# Patient Record
Sex: MALE | Race: OTHER | NOT HISPANIC OR LATINO
[De-identification: names, ages, dates, MRNs, and addresses within clinical notes are randomized per-mention and may not be internally consistent; named-entity substitution may affect disease eponyms.]

---

## 2017-01-06 ENCOUNTER — APPOINTMENT (OUTPATIENT)
Dept: NEUROLOGY | Facility: CLINIC | Age: 42
End: 2017-01-06

## 2017-01-06 VITALS
WEIGHT: 125 LBS | HEIGHT: 67 IN | SYSTOLIC BLOOD PRESSURE: 102 MMHG | OXYGEN SATURATION: 99 % | HEART RATE: 93 BPM | DIASTOLIC BLOOD PRESSURE: 67 MMHG | TEMPERATURE: 97.4 F | BODY MASS INDEX: 19.62 KG/M2

## 2017-01-06 DIAGNOSIS — R56.9 UNSPECIFIED CONVULSIONS: ICD-10-CM

## 2017-01-26 ENCOUNTER — APPOINTMENT (OUTPATIENT)
Dept: PULMONOLOGY | Facility: CLINIC | Age: 42
End: 2017-01-26

## 2017-04-03 ENCOUNTER — APPOINTMENT (OUTPATIENT)
Dept: NEUROLOGY | Facility: CLINIC | Age: 42
End: 2017-04-03

## 2017-04-03 VITALS
DIASTOLIC BLOOD PRESSURE: 81 MMHG | BODY MASS INDEX: 19.78 KG/M2 | HEIGHT: 67 IN | WEIGHT: 126 LBS | SYSTOLIC BLOOD PRESSURE: 116 MMHG | OXYGEN SATURATION: 98 % | HEART RATE: 84 BPM

## 2017-09-01 ENCOUNTER — RX RENEWAL (OUTPATIENT)
Age: 42
End: 2017-09-01

## 2017-09-11 ENCOUNTER — OTHER (OUTPATIENT)
Age: 42
End: 2017-09-11

## 2017-10-17 ENCOUNTER — LABORATORY RESULT (OUTPATIENT)
Age: 42
End: 2017-10-17

## 2017-10-17 ENCOUNTER — APPOINTMENT (OUTPATIENT)
Dept: PULMONOLOGY | Facility: CLINIC | Age: 42
End: 2017-10-17
Payer: COMMERCIAL

## 2017-10-17 VITALS
SYSTOLIC BLOOD PRESSURE: 122 MMHG | OXYGEN SATURATION: 97 % | TEMPERATURE: 97.6 F | HEIGHT: 67 IN | HEART RATE: 83 BPM | DIASTOLIC BLOOD PRESSURE: 80 MMHG | WEIGHT: 126 LBS | BODY MASS INDEX: 19.78 KG/M2

## 2017-10-17 DIAGNOSIS — Z87.09 PERSONAL HISTORY OF OTHER DISEASES OF THE RESPIRATORY SYSTEM: ICD-10-CM

## 2017-10-17 DIAGNOSIS — G90.519 COMPLEX REGIONAL PAIN SYNDROME I OF UNSPECIFIED UPPER LIMB: ICD-10-CM

## 2017-10-17 PROCEDURE — 99214 OFFICE O/P EST MOD 30 MIN: CPT | Mod: 25

## 2017-10-17 PROCEDURE — 94010 BREATHING CAPACITY TEST: CPT

## 2017-10-17 PROCEDURE — 71020: CPT

## 2017-10-23 LAB
25(OH)D3 SERPL-MCNC: 25.9 NG/ML
ALBUMIN SERPL ELPH-MCNC: 5 G/DL
ALP BLD-CCNC: 137 U/L
ALT SERPL-CCNC: 50 U/L
ANION GAP SERPL CALC-SCNC: 26 MMOL/L
AST SERPL-CCNC: 25 U/L
BASOPHILS # BLD AUTO: 0.05 K/UL
BASOPHILS NFR BLD AUTO: 0.7 %
BILIRUB SERPL-MCNC: 0.2 MG/DL
BUN SERPL-MCNC: 8 MG/DL
CALCIUM SERPL-MCNC: 10 MG/DL
CHLORIDE SERPL-SCNC: 93 MMOL/L
CO2 SERPL-SCNC: 18 MMOL/L
CREAT SERPL-MCNC: 0.84 MG/DL
EOSINOPHIL # BLD AUTO: 0.24 K/UL
EOSINOPHIL NFR BLD AUTO: 3.1 %
GLUCOSE SERPL-MCNC: 48 MG/DL
HCT VFR BLD CALC: 46.4 %
HGB BLD-MCNC: 16.2 G/DL
IMM GRANULOCYTES NFR BLD AUTO: 0.3 %
LITHIUM SERPL-SCNC: <.2 MMOL/L
LYMPHOCYTES # BLD AUTO: 3.17 K/UL
LYMPHOCYTES NFR BLD AUTO: 41.5 %
MAN DIFF?: NORMAL
MCHC RBC-ENTMCNC: 32.9 PG
MCHC RBC-ENTMCNC: 34.9 GM/DL
MCV RBC AUTO: 94.3 FL
MONOCYTES # BLD AUTO: 0.58 K/UL
MONOCYTES NFR BLD AUTO: 7.6 %
NEUTROPHILS # BLD AUTO: 3.58 K/UL
NEUTROPHILS NFR BLD AUTO: 46.8 %
PLATELET # BLD AUTO: 333 K/UL
POTASSIUM SERPL-SCNC: 4.5 MMOL/L
PROT SERPL-MCNC: 7.8 G/DL
RBC # BLD: 4.92 M/UL
RBC # FLD: 12.8 %
SODIUM SERPL-SCNC: 137 MMOL/L
T3RU NFR SERPL: 1.12 INDEX
T4 FREE SERPL-MCNC: 1.2 NG/DL
T4 SERPL-MCNC: 6.7 UG/DL
TSH SERPL-ACNC: 2.01 UIU/ML
VALPROATE SERPL-MCNC: <2 UG/ML
WBC # FLD AUTO: 7.64 K/UL

## 2017-11-29 ENCOUNTER — APPOINTMENT (OUTPATIENT)
Dept: NEUROLOGY | Facility: CLINIC | Age: 42
End: 2017-11-29

## 2017-11-30 ENCOUNTER — RX RENEWAL (OUTPATIENT)
Age: 42
End: 2017-11-30

## 2018-01-03 ENCOUNTER — RX RENEWAL (OUTPATIENT)
Age: 43
End: 2018-01-03

## 2018-01-03 DIAGNOSIS — R51 HEADACHE: ICD-10-CM

## 2018-02-20 ENCOUNTER — APPOINTMENT (OUTPATIENT)
Dept: PULMONOLOGY | Facility: CLINIC | Age: 43
End: 2018-02-20

## 2018-05-02 ENCOUNTER — APPOINTMENT (OUTPATIENT)
Dept: OTOLARYNGOLOGY | Facility: CLINIC | Age: 43
End: 2018-05-02

## 2018-07-26 PROBLEM — R56.9 SEIZURE: Status: ACTIVE | Noted: 2017-01-06

## 2018-08-16 ENCOUNTER — APPOINTMENT (OUTPATIENT)
Dept: PULMONOLOGY | Facility: CLINIC | Age: 43
End: 2018-08-16

## 2018-09-07 ENCOUNTER — APPOINTMENT (OUTPATIENT)
Dept: PULMONOLOGY | Facility: CLINIC | Age: 43
End: 2018-09-07

## 2018-09-18 ENCOUNTER — APPOINTMENT (OUTPATIENT)
Dept: NEUROLOGY | Facility: CLINIC | Age: 43
End: 2018-09-18

## 2018-10-30 ENCOUNTER — APPOINTMENT (OUTPATIENT)
Dept: NEUROLOGY | Facility: CLINIC | Age: 43
End: 2018-10-30
Payer: COMMERCIAL

## 2018-10-30 VITALS
OXYGEN SATURATION: 94 % | WEIGHT: 126 LBS | HEART RATE: 90 BPM | DIASTOLIC BLOOD PRESSURE: 74 MMHG | HEIGHT: 67 IN | BODY MASS INDEX: 19.78 KG/M2 | SYSTOLIC BLOOD PRESSURE: 109 MMHG

## 2018-10-30 PROCEDURE — 99214 OFFICE O/P EST MOD 30 MIN: CPT

## 2018-11-01 ENCOUNTER — APPOINTMENT (OUTPATIENT)
Dept: OTOLARYNGOLOGY | Facility: CLINIC | Age: 43
End: 2018-11-01

## 2019-05-07 ENCOUNTER — RX RENEWAL (OUTPATIENT)
Age: 44
End: 2019-05-07

## 2019-06-11 ENCOUNTER — APPOINTMENT (OUTPATIENT)
Dept: NEUROLOGY | Facility: CLINIC | Age: 44
End: 2019-06-11

## 2019-07-15 ENCOUNTER — APPOINTMENT (OUTPATIENT)
Dept: PULMONOLOGY | Facility: CLINIC | Age: 44
End: 2019-07-15

## 2019-07-29 ENCOUNTER — APPOINTMENT (OUTPATIENT)
Dept: PULMONOLOGY | Facility: CLINIC | Age: 44
End: 2019-07-29

## 2019-09-10 ENCOUNTER — RX RENEWAL (OUTPATIENT)
Age: 44
End: 2019-09-10

## 2019-09-12 ENCOUNTER — RX RENEWAL (OUTPATIENT)
Age: 44
End: 2019-09-12

## 2019-10-01 ENCOUNTER — RX RENEWAL (OUTPATIENT)
Age: 44
End: 2019-10-01

## 2019-10-25 ENCOUNTER — APPOINTMENT (OUTPATIENT)
Dept: OTOLARYNGOLOGY | Facility: CLINIC | Age: 44
End: 2019-10-25

## 2019-11-04 ENCOUNTER — APPOINTMENT (OUTPATIENT)
Dept: PULMONOLOGY | Facility: CLINIC | Age: 44
End: 2019-11-04
Payer: COMMERCIAL

## 2019-11-04 VITALS
WEIGHT: 126 LBS | OXYGEN SATURATION: 95 % | BODY MASS INDEX: 19.78 KG/M2 | HEART RATE: 102 BPM | HEIGHT: 67 IN | TEMPERATURE: 97.5 F

## 2019-11-04 DIAGNOSIS — R04.2 HEMOPTYSIS: ICD-10-CM

## 2019-11-04 PROCEDURE — 71046 X-RAY EXAM CHEST 2 VIEWS: CPT

## 2019-11-04 PROCEDURE — 99214 OFFICE O/P EST MOD 30 MIN: CPT

## 2019-11-05 NOTE — HISTORY OF PRESENT ILLNESS
[FreeTextEntry1] : coughing up blood, for been a month.feels fatigued.  yellow .  moderate amoutn of blood.  he has a seizure disorder and causalgia from a chest tube insertion that occurred after ptx surgery.  disabled because of this and seizure disorder as well.  no fever, no increased dyspnea, no evidence for exacerbations

## 2019-11-05 NOTE — PROCEDURE
[FreeTextEntry1] : spirometry not done; his pain syndrome is worsened when he breaths out hard\par \par chest film no change from before

## 2019-11-05 NOTE — DISCUSSION/SUMMARY
[FreeTextEntry1] : likely benign bronchial bleeding,\par \par but in light of extensive smoking hx will get a ct of the chest\par \par also will get sputum culture

## 2019-11-05 NOTE — REASON FOR VISIT
[Follow-Up] : a follow-up visit [FreeTextEntry1] : ex smoker with unclear pulmonary disease ( restrictiive) who has coughed up sputum mixed with blood

## 2019-11-05 NOTE — PHYSICAL EXAM
[FreeTextEntry1] : thin [Normal Conjunctiva] : the conjunctiva exhibited no abnormalities [Eyelids - No Xanthelasma] : the eyelids demonstrated no xanthelasmas [Neck Appearance] : the appearance of the neck was normal [Neck Cervical Mass (___cm)] : no neck mass was observed [Jugular Venous Distention Increased] : there was no jugular-venous distention [Thyroid Diffuse Enlargement] : the thyroid was not enlarged [Thyroid Nodule] : there were no palpable thyroid nodules [Heart Rate And Rhythm] : heart rate and rhythm were normal [Heart Sounds] : normal S1 and S2 [Murmurs] : no murmurs present [Respiration, Rhythm And Depth] : normal respiratory rhythm and effort [Exaggerated Use Of Accessory Muscles For Inspiration] : no accessory muscle use [Auscultation Breath Sounds / Voice Sounds] : lungs were clear to auscultation bilaterally [Abdomen Soft] : soft [Abdomen Tenderness] : non-tender [Abdomen Mass (___ Cm)] : no abdominal mass palpated [Abnormal Walk] : normal gait [Gait - Sufficient For Exercise Testing] : the gait was sufficient for exercise testing [Nail Clubbing] : no clubbing of the fingernails [Cyanosis, Localized] : no localized cyanosis [Petechial Hemorrhages (___cm)] : no petechial hemorrhages [] : no ischemic changes [Deep Tendon Reflexes (DTR)] : deep tendon reflexes were 2+ and symmetric [Sensation] : the sensory exam was normal to light touch and pinprick [No Focal Deficits] : no focal deficits [Oriented To Time, Place, And Person] : oriented to person, place, and time [Impaired Insight] : insight and judgment were intact [Affect] : the affect was normal

## 2019-11-05 NOTE — REVIEW OF SYSTEMS
[Fatigue] : fatigue [Cough] : cough [Sputum] : sputum  [Dyspnea] : no dyspnea [Nocturia] : nocturia [As Noted in HPI] : as noted in HPI [Seizures] : seizures [Negative] : Hematologic

## 2019-11-07 ENCOUNTER — APPOINTMENT (OUTPATIENT)
Dept: NEUROLOGY | Facility: CLINIC | Age: 44
End: 2019-11-07

## 2019-11-14 ENCOUNTER — APPOINTMENT (OUTPATIENT)
Dept: NEUROLOGY | Facility: CLINIC | Age: 44
End: 2019-11-14
Payer: COMMERCIAL

## 2019-11-14 VITALS
DIASTOLIC BLOOD PRESSURE: 90 MMHG | WEIGHT: 126 LBS | HEIGHT: 67 IN | SYSTOLIC BLOOD PRESSURE: 127 MMHG | OXYGEN SATURATION: 95 % | BODY MASS INDEX: 19.78 KG/M2 | HEART RATE: 102 BPM

## 2019-11-14 PROCEDURE — 99214 OFFICE O/P EST MOD 30 MIN: CPT

## 2019-11-22 ENCOUNTER — APPOINTMENT (OUTPATIENT)
Dept: OTOLARYNGOLOGY | Facility: CLINIC | Age: 44
End: 2019-11-22
Payer: COMMERCIAL

## 2019-11-22 VITALS
HEART RATE: 98 BPM | OXYGEN SATURATION: 98 % | SYSTOLIC BLOOD PRESSURE: 106 MMHG | DIASTOLIC BLOOD PRESSURE: 73 MMHG | HEIGHT: 67 IN | WEIGHT: 165 LBS | BODY MASS INDEX: 25.9 KG/M2

## 2019-11-22 DIAGNOSIS — J34.89 OTHER SPECIFIED DISORDERS OF NOSE AND NASAL SINUSES: ICD-10-CM

## 2019-11-22 DIAGNOSIS — H04.201 UNSPECIFIED EPIPHORA, RIGHT SIDE: ICD-10-CM

## 2019-11-22 PROCEDURE — 99204 OFFICE O/P NEW MOD 45 MIN: CPT | Mod: 25

## 2019-11-22 PROCEDURE — 69210 REMOVE IMPACTED EAR WAX UNI: CPT

## 2019-11-22 PROCEDURE — 31231 NASAL ENDOSCOPY DX: CPT

## 2019-11-22 NOTE — HISTORY OF PRESENT ILLNESS
[de-identified] : 44M w/ PMH of silent sinus syndrome s/p right MMA, partial ethmoidectomy and septoplasty 8/19/14, now with right eye epiphora. Patient reports intermittent nasal congestion, right ear cerumen impaction and longstanding right ear hearing loss. Symptoms have been persistent over the past year. Symptoms are constant during the day.  He has no mitigating or exacerbating factors. \par \par Pt feels some ear fullness as well.  [Hearing Loss] : no hearing loss [Ear Fullness] : ear fullness [Tinnitus] : no tinnitus [Vertigo] : no vertigo [None] : No associated symptoms are reported.

## 2019-11-22 NOTE — REVIEW OF SYSTEMS
[Patient Intake Form Reviewed] : Patient intake form was reviewed [Hearing Loss] : hearing loss [Nasal Congestion] : nasal congestion [Recurrent Sinus Infections] : recurrent sinus infections [As Noted in HPI] : as noted in HPI [Negative] : Constitutional [Ear Pain] : no ear pain [Sinus Pain] : no sinus pain [Sinus Pressure] : no sinus pressure [Discolored Nasal Discharge] : no discolored nasal discharge [de-identified] : recurrent sinus infections since subsided [FreeTextEntry1] : all other ROS negative

## 2019-11-22 NOTE — PROCEDURE
[Topical Lidocaine] : topical lidocaine [Oxymetazoline HCl] : oxymetazoline HCl [Flexible Endoscope] : examined with the flexible endoscope [Nasal Mucosa Crusts / Sores] : no lesions [Nasal Mucosa] : no polyps [Normal] : the nasopharynx was normal [FreeTextEntry6] : The following anatomic sites were directly examined in a sequential fashion:\par The scope was introduced in the nasal passage between the middle and inferior turbinates to exam the inferior portion of the middle meatus and the fontanelle, as well as the maxillary ostia. Next, the scope was passed medically and posteriorly to the middle turbinates to examine the sphenoethmoid recess and the superior turbinate region.\par  [de-identified] : epiphora

## 2019-11-22 NOTE — PHYSICAL EXAM
[Nasal Endoscopy Performed] : nasal endoscopy was performed, see procedure section for findings [Normal] : no rashes [de-identified] : right ear cerumen, otherwise TMs intact bilaterally [de-identified] : no evidence of lacrimal duct drainage scarring, no masses or lesions

## 2019-11-25 ENCOUNTER — APPOINTMENT (OUTPATIENT)
Dept: PULMONOLOGY | Facility: CLINIC | Age: 44
End: 2019-11-25

## 2019-12-02 ENCOUNTER — FORM ENCOUNTER (OUTPATIENT)
Age: 44
End: 2019-12-02

## 2019-12-03 ENCOUNTER — APPOINTMENT (OUTPATIENT)
Dept: CT IMAGING | Facility: HOSPITAL | Age: 44
End: 2019-12-03
Payer: COMMERCIAL

## 2019-12-03 ENCOUNTER — OUTPATIENT (OUTPATIENT)
Dept: OUTPATIENT SERVICES | Facility: HOSPITAL | Age: 44
LOS: 1 days | End: 2019-12-03
Payer: COMMERCIAL

## 2019-12-03 PROCEDURE — 70486 CT MAXILLOFACIAL W/O DYE: CPT

## 2019-12-03 PROCEDURE — 70486 CT MAXILLOFACIAL W/O DYE: CPT | Mod: 26

## 2019-12-08 ENCOUNTER — TRANSCRIPTION ENCOUNTER (OUTPATIENT)
Age: 44
End: 2019-12-08

## 2019-12-11 ENCOUNTER — NON-APPOINTMENT (OUTPATIENT)
Age: 44
End: 2019-12-11

## 2019-12-11 ENCOUNTER — APPOINTMENT (OUTPATIENT)
Dept: OPHTHALMOLOGY | Facility: CLINIC | Age: 44
End: 2019-12-11
Payer: COMMERCIAL

## 2019-12-11 PROCEDURE — 92134 CPTRZ OPH DX IMG PST SGM RTA: CPT

## 2019-12-11 PROCEDURE — 92004 COMPRE OPH EXAM NEW PT 1/>: CPT

## 2019-12-20 ENCOUNTER — APPOINTMENT (OUTPATIENT)
Dept: OPHTHALMOLOGY | Facility: CLINIC | Age: 44
End: 2019-12-20

## 2020-02-18 ENCOUNTER — RX RENEWAL (OUTPATIENT)
Age: 45
End: 2020-02-18

## 2020-03-11 ENCOUNTER — RX RENEWAL (OUTPATIENT)
Age: 45
End: 2020-03-11

## 2020-05-06 ENCOUNTER — APPOINTMENT (OUTPATIENT)
Dept: NEUROLOGY | Facility: CLINIC | Age: 45
End: 2020-05-06
Payer: COMMERCIAL

## 2020-05-06 PROCEDURE — 99213 OFFICE O/P EST LOW 20 MIN: CPT | Mod: 95

## 2020-05-06 RX ORDER — OMEGA-3/DHA/EPA/FISH OIL 300-1000MG
400 CAPSULE ORAL
Qty: 30 | Refills: 4 | Status: DISCONTINUED | COMMUNITY
Start: 2017-04-03 | End: 2020-05-06

## 2020-08-07 RX ORDER — LEVALBUTEROL TARTRATE 45 UG/1
45 AEROSOL, METERED ORAL
Qty: 1 | Refills: 11 | Status: ACTIVE | COMMUNITY
Start: 2017-10-17 | End: 1900-01-01

## 2020-08-19 ENCOUNTER — RX RENEWAL (OUTPATIENT)
Age: 45
End: 2020-08-19

## 2020-10-08 ENCOUNTER — APPOINTMENT (OUTPATIENT)
Dept: NEUROLOGY | Facility: CLINIC | Age: 45
End: 2020-10-08

## 2020-10-13 ENCOUNTER — APPOINTMENT (OUTPATIENT)
Dept: NEUROLOGY | Facility: CLINIC | Age: 45
End: 2020-10-13

## 2020-10-14 ENCOUNTER — APPOINTMENT (OUTPATIENT)
Dept: NEUROLOGY | Facility: CLINIC | Age: 45
End: 2020-10-14

## 2020-10-20 ENCOUNTER — APPOINTMENT (OUTPATIENT)
Dept: OPHTHALMOLOGY | Facility: CLINIC | Age: 45
End: 2020-10-20

## 2020-11-17 ENCOUNTER — APPOINTMENT (OUTPATIENT)
Dept: OPHTHALMOLOGY | Facility: CLINIC | Age: 45
End: 2020-11-17

## 2020-11-19 ENCOUNTER — APPOINTMENT (OUTPATIENT)
Dept: NEUROLOGY | Facility: CLINIC | Age: 45
End: 2020-11-19
Payer: COMMERCIAL

## 2020-11-19 VITALS
OXYGEN SATURATION: 94 % | TEMPERATURE: 97.1 F | SYSTOLIC BLOOD PRESSURE: 114 MMHG | DIASTOLIC BLOOD PRESSURE: 82 MMHG | HEART RATE: 85 BPM | HEIGHT: 67 IN | BODY MASS INDEX: 25.9 KG/M2 | WEIGHT: 165 LBS

## 2020-11-19 PROCEDURE — 99214 OFFICE O/P EST MOD 30 MIN: CPT

## 2020-12-11 ENCOUNTER — APPOINTMENT (OUTPATIENT)
Dept: OPHTHALMOLOGY | Facility: CLINIC | Age: 45
End: 2020-12-11

## 2021-01-27 ENCOUNTER — TRANSCRIPTION ENCOUNTER (OUTPATIENT)
Age: 46
End: 2021-01-27

## 2021-02-01 ENCOUNTER — RX RENEWAL (OUTPATIENT)
Age: 46
End: 2021-02-01

## 2021-02-17 ENCOUNTER — APPOINTMENT (OUTPATIENT)
Dept: NEUROLOGY | Facility: CLINIC | Age: 46
End: 2021-02-17

## 2021-06-14 ENCOUNTER — RX RENEWAL (OUTPATIENT)
Age: 46
End: 2021-06-14

## 2021-07-30 ENCOUNTER — APPOINTMENT (OUTPATIENT)
Dept: PULMONOLOGY | Facility: CLINIC | Age: 46
End: 2021-07-30

## 2021-08-13 DIAGNOSIS — Z00.00 ENCOUNTER FOR GENERAL ADULT MEDICAL EXAMINATION W/OUT ABNORMAL FINDINGS: ICD-10-CM

## 2021-08-31 ENCOUNTER — APPOINTMENT (OUTPATIENT)
Dept: NEUROLOGY | Facility: CLINIC | Age: 46
End: 2021-08-31

## 2021-09-01 ENCOUNTER — APPOINTMENT (OUTPATIENT)
Dept: NEUROLOGY | Facility: CLINIC | Age: 46
End: 2021-09-01

## 2021-09-20 ENCOUNTER — APPOINTMENT (OUTPATIENT)
Dept: PULMONOLOGY | Facility: CLINIC | Age: 46
End: 2021-09-20

## 2021-09-22 ENCOUNTER — APPOINTMENT (OUTPATIENT)
Dept: NEUROLOGY | Facility: CLINIC | Age: 46
End: 2021-09-22

## 2021-09-24 ENCOUNTER — TRANSCRIPTION ENCOUNTER (OUTPATIENT)
Age: 46
End: 2021-09-24

## 2021-09-27 ENCOUNTER — TRANSCRIPTION ENCOUNTER (OUTPATIENT)
Age: 46
End: 2021-09-27

## 2021-09-29 ENCOUNTER — APPOINTMENT (OUTPATIENT)
Dept: NEUROLOGY | Facility: CLINIC | Age: 46
End: 2021-09-29
Payer: COMMERCIAL

## 2021-09-29 ENCOUNTER — NON-APPOINTMENT (OUTPATIENT)
Age: 46
End: 2021-09-29

## 2021-09-29 VITALS
DIASTOLIC BLOOD PRESSURE: 90 MMHG | SYSTOLIC BLOOD PRESSURE: 129 MMHG | OXYGEN SATURATION: 96 % | WEIGHT: 170 LBS | TEMPERATURE: 97.6 F | BODY MASS INDEX: 26.68 KG/M2 | HEART RATE: 94 BPM | HEIGHT: 67 IN

## 2021-09-29 DIAGNOSIS — S09.90XA UNSPECIFIED INJURY OF HEAD, INITIAL ENCOUNTER: ICD-10-CM

## 2021-09-29 PROCEDURE — 99214 OFFICE O/P EST MOD 30 MIN: CPT

## 2021-09-29 RX ORDER — CEFDINIR 300 MG/1
300 CAPSULE ORAL
Qty: 14 | Refills: 0 | Status: DISCONTINUED | COMMUNITY
Start: 2019-11-11 | End: 2021-09-29

## 2021-09-29 RX ORDER — LEVETIRACETAM 1000 MG/1
1000 TABLET, FILM COATED ORAL
Qty: 30 | Refills: 5 | Status: DISCONTINUED | COMMUNITY
Start: 2017-04-03 | End: 2021-09-29

## 2021-10-07 ENCOUNTER — TRANSCRIPTION ENCOUNTER (OUTPATIENT)
Age: 46
End: 2021-10-07

## 2021-10-08 ENCOUNTER — TRANSCRIPTION ENCOUNTER (OUTPATIENT)
Age: 46
End: 2021-10-08

## 2021-11-22 ENCOUNTER — APPOINTMENT (OUTPATIENT)
Dept: OPHTHALMOLOGY | Facility: CLINIC | Age: 46
End: 2021-11-22

## 2021-12-13 ENCOUNTER — RX RENEWAL (OUTPATIENT)
Age: 46
End: 2021-12-13

## 2022-02-17 ENCOUNTER — RX RENEWAL (OUTPATIENT)
Age: 47
End: 2022-02-17

## 2022-03-08 ENCOUNTER — APPOINTMENT (OUTPATIENT)
Dept: PULMONOLOGY | Facility: CLINIC | Age: 47
End: 2022-03-08

## 2022-03-17 ENCOUNTER — APPOINTMENT (OUTPATIENT)
Dept: NEUROLOGY | Facility: CLINIC | Age: 47
End: 2022-03-17

## 2022-04-20 ENCOUNTER — RX RENEWAL (OUTPATIENT)
Age: 47
End: 2022-04-20

## 2022-05-09 ENCOUNTER — APPOINTMENT (OUTPATIENT)
Dept: NEUROLOGY | Facility: CLINIC | Age: 47
End: 2022-05-09

## 2022-05-26 RX ORDER — LEVALBUTEROL TARTRATE 45 UG/1
45 AEROSOL, METERED ORAL
Qty: 15 | Refills: 11 | Status: ACTIVE | COMMUNITY
Start: 2021-06-14 | End: 1900-01-01

## 2022-06-14 ENCOUNTER — RX RENEWAL (OUTPATIENT)
Age: 47
End: 2022-06-14

## 2022-07-20 ENCOUNTER — RX RENEWAL (OUTPATIENT)
Age: 47
End: 2022-07-20

## 2022-08-12 ENCOUNTER — APPOINTMENT (OUTPATIENT)
Dept: PULMONOLOGY | Facility: CLINIC | Age: 47
End: 2022-08-12

## 2022-08-12 VITALS
SYSTOLIC BLOOD PRESSURE: 140 MMHG | WEIGHT: 175 LBS | OXYGEN SATURATION: 96 % | DIASTOLIC BLOOD PRESSURE: 93 MMHG | TEMPERATURE: 97.3 F | HEIGHT: 67 IN | HEART RATE: 87 BPM | BODY MASS INDEX: 27.47 KG/M2

## 2022-08-12 PROCEDURE — 99214 OFFICE O/P EST MOD 30 MIN: CPT

## 2022-08-13 LAB — A1AT SERPL-MCNC: 191 MG/DL

## 2022-08-14 NOTE — DISCUSSION/SUMMARY
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

## 2022-08-14 NOTE — REVIEW OF SYSTEMS
[Fatigue] : fatigue [Cough] : cough [Dyspnea] : dyspnea [Negative] : Psychiatric [TextBox_91] : causalgia

## 2022-08-14 NOTE — HISTORY OF PRESENT ILLNESS
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

## 2022-08-17 ENCOUNTER — TRANSCRIPTION ENCOUNTER (OUTPATIENT)
Age: 47
End: 2022-08-17

## 2022-08-17 ENCOUNTER — NON-APPOINTMENT (OUTPATIENT)
Age: 47
End: 2022-08-17

## 2022-08-17 ENCOUNTER — APPOINTMENT (OUTPATIENT)
Dept: NEUROLOGY | Facility: CLINIC | Age: 47
End: 2022-08-17

## 2022-08-17 ENCOUNTER — OUTPATIENT (OUTPATIENT)
Dept: OUTPATIENT SERVICES | Facility: HOSPITAL | Age: 47
LOS: 1 days | End: 2022-08-17
Payer: COMMERCIAL

## 2022-08-17 ENCOUNTER — APPOINTMENT (OUTPATIENT)
Dept: CT IMAGING | Facility: HOSPITAL | Age: 47
End: 2022-08-17

## 2022-08-17 VITALS
DIASTOLIC BLOOD PRESSURE: 91 MMHG | TEMPERATURE: 96.3 F | SYSTOLIC BLOOD PRESSURE: 137 MMHG | BODY MASS INDEX: 25.9 KG/M2 | OXYGEN SATURATION: 95 % | HEART RATE: 80 BPM | WEIGHT: 165 LBS | HEIGHT: 67 IN

## 2022-08-17 LAB
A1AT PHENOTYP SERPL-IMP: NORMAL
A1AT SERPL-MCNC: 179 MG/DL

## 2022-08-17 PROCEDURE — 71250 CT THORAX DX C-: CPT

## 2022-08-17 PROCEDURE — 99214 OFFICE O/P EST MOD 30 MIN: CPT

## 2022-08-17 PROCEDURE — 71250 CT THORAX DX C-: CPT | Mod: 26

## 2022-09-29 ENCOUNTER — TRANSCRIPTION ENCOUNTER (OUTPATIENT)
Age: 47
End: 2022-09-29

## 2022-11-23 ENCOUNTER — TRANSCRIPTION ENCOUNTER (OUTPATIENT)
Age: 47
End: 2022-11-23

## 2022-11-23 RX ORDER — REVEFENACIN 175 UG/3ML
175 SOLUTION RESPIRATORY (INHALATION)
Qty: 30 | Refills: 11 | Status: ACTIVE | COMMUNITY
Start: 2022-11-23 | End: 1900-01-01

## 2023-02-13 ENCOUNTER — RX RENEWAL (OUTPATIENT)
Age: 48
End: 2023-02-13

## 2023-04-12 ENCOUNTER — RX RENEWAL (OUTPATIENT)
Age: 48
End: 2023-04-12

## 2023-04-12 RX ORDER — BUDESONIDE AND FORMOTEROL FUMARATE DIHYDRATE 160; 4.5 UG/1; UG/1
160-4.5 AEROSOL RESPIRATORY (INHALATION)
Qty: 10.2 | Refills: 11 | Status: ACTIVE | COMMUNITY
Start: 2017-10-17 | End: 1900-01-01

## 2023-07-19 ENCOUNTER — RX RENEWAL (OUTPATIENT)
Age: 48
End: 2023-07-19

## 2023-08-02 ENCOUNTER — APPOINTMENT (OUTPATIENT)
Dept: NEUROLOGY | Facility: CLINIC | Age: 48
End: 2023-08-02
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
## 2023-08-02 NOTE — END OF VISIT
[Time Spent: ___ minutes] : I have spent [unfilled] minutes of time on the encounter. [>50% of the face to face encounter time was spent on counseling and/or coordination of care for ___] : Greater than 50% of the face to face encounter time was spent on counseling and/or coordination of care for [unfilled] [4. Prevent psychological harm to patient or others] : Reason - Prevent psychological harm to patient or others [>50% of Time Spent on Counseling and Coordination of Care for  ___] : Greater than 50% of the encounter time was spent on counseling and coordination of care for [unfilled]

## 2023-08-02 NOTE — PHYSICAL EXAM
[FreeTextEntry1] : MS- AAOx3, speech fluent, no dysarthria. Naming and repetition intact. Follows 3 step commands, remote and recent memory intact. Fund of knowledge full.\par  CN- PERRLA, EOMI, VFFC, face symmetric, V1-V3 intact, tongue midline, palate symmetric, shoulder shrug symmetric, hearing grossly intact.\par  Motor- 5/5 x 4\par  Sensory-LT/Vib/PP/proprioception intact throughout \par  Reflexes-2+  throughout, plantar mute R, withdrawal on L\par  Coordination- FTN intact, no tremor or dysmetria\par  Gait- steady normal including heel, toe, tandem walking intact today\par  trapezius tension L>R\par  \par

## 2023-08-02 NOTE — DATA REVIEWED
[de-identified] : * Pt reports having lung staples that are not MRI comptabible [de-identified] : 11/2014- REEG normal\par  12/18-12/20/2014 amb EEG normal [de-identified] : 11/2014: CTH- asymmetric prominence of right lateral ventricle
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
## 2023-08-21 ENCOUNTER — RX RENEWAL (OUTPATIENT)
Age: 48
End: 2023-08-21

## 2023-08-21 RX ORDER — CHLORHEXIDINE GLUCONATE 4 %
400 (240 MG) LIQUID (ML) TOPICAL
Qty: 30 | Refills: 5 | Status: ACTIVE | COMMUNITY
Start: 2019-09-10 | End: 1900-01-01

## 2023-10-12 ENCOUNTER — APPOINTMENT (OUTPATIENT)
Dept: NEUROLOGY | Facility: CLINIC | Age: 48
End: 2023-10-12
Payer: COMMERCIAL

## 2023-10-12 PROCEDURE — 99442: CPT | Mod: 95

## 2023-11-01 ENCOUNTER — TRANSCRIPTION ENCOUNTER (OUTPATIENT)
Age: 48
End: 2023-11-01

## 2023-11-01 RX ORDER — REVEFENACIN 175 UG/3ML
175 SOLUTION RESPIRATORY (INHALATION)
Qty: 90 | Refills: 3 | Status: ACTIVE | COMMUNITY
Start: 2022-12-19 | End: 1900-01-01

## 2024-01-01 ENCOUNTER — RX RENEWAL (OUTPATIENT)
Age: 49
End: 2024-01-01

## 2024-01-23 ENCOUNTER — RX RENEWAL (OUTPATIENT)
Age: 49
End: 2024-01-23

## 2024-02-05 ENCOUNTER — RX RENEWAL (OUTPATIENT)
Age: 49
End: 2024-02-05

## 2024-02-05 ENCOUNTER — TRANSCRIPTION ENCOUNTER (OUTPATIENT)
Age: 49
End: 2024-02-05

## 2024-02-06 ENCOUNTER — TRANSCRIPTION ENCOUNTER (OUTPATIENT)
Age: 49
End: 2024-02-06

## 2024-02-06 ENCOUNTER — RX RENEWAL (OUTPATIENT)
Age: 49
End: 2024-02-06

## 2024-02-06 RX ORDER — LEVETIRACETAM 1000 MG/1
1000 TABLET, FILM COATED ORAL
Qty: 60 | Refills: 5 | Status: ACTIVE | COMMUNITY
Start: 2021-09-29 | End: 1900-01-01

## 2024-04-05 NOTE — REASON FOR VISIT
ECO-when patient returns call please schedule appointment as noted in prior message below from nurse.    Called patient and left voice mail message to call back to schedule appointment.    Patient on recall list   [Follow-Up] : a follow-up visit [TextBox_44] : patient with unclear lung disease

## 2024-06-24 ENCOUNTER — RX RENEWAL (OUTPATIENT)
Age: 49
End: 2024-06-24

## 2024-06-24 RX ORDER — CHLORHEXIDINE GLUCONATE 4 %
400 (240 MG) LIQUID (ML) TOPICAL
Qty: 30 | Refills: 5 | Status: ACTIVE | COMMUNITY
Start: 2020-02-18 | End: 1900-01-01

## 2024-06-24 RX ORDER — LEVETIRACETAM 750 MG/1
750 TABLET, FILM COATED ORAL DAILY
Qty: 30 | Refills: 5 | Status: ACTIVE | COMMUNITY
Start: 2017-01-06 | End: 1900-01-01

## 2024-07-09 ENCOUNTER — APPOINTMENT (OUTPATIENT)
Dept: PULMONOLOGY | Facility: CLINIC | Age: 49
End: 2024-07-09
Payer: COMMERCIAL

## 2024-07-09 VITALS
HEART RATE: 81 BPM | OXYGEN SATURATION: 92 % | BODY MASS INDEX: 21.69 KG/M2 | DIASTOLIC BLOOD PRESSURE: 76 MMHG | TEMPERATURE: 97.9 F | WEIGHT: 135 LBS | SYSTOLIC BLOOD PRESSURE: 110 MMHG | HEIGHT: 66 IN

## 2024-07-09 PROCEDURE — 99214 OFFICE O/P EST MOD 30 MIN: CPT

## 2024-07-09 RX ORDER — BUDESONIDE AND FORMOTEROL FUMARATE DIHYDRATE 160; 4.5 UG/1; UG/1
160-4.5 AEROSOL RESPIRATORY (INHALATION)
Qty: 1 | Refills: 11 | Status: ACTIVE | COMMUNITY
Start: 2024-07-09 | End: 1900-01-01

## 2024-07-15 ENCOUNTER — RX RENEWAL (OUTPATIENT)
Age: 49
End: 2024-07-15

## 2024-08-12 ENCOUNTER — TRANSCRIPTION ENCOUNTER (OUTPATIENT)
Age: 49
End: 2024-08-12

## 2024-12-04 ENCOUNTER — NON-APPOINTMENT (OUTPATIENT)
Age: 49
End: 2024-12-04

## 2024-12-17 ENCOUNTER — APPOINTMENT (OUTPATIENT)
Dept: NEUROLOGY | Facility: CLINIC | Age: 49
End: 2024-12-17

## 2025-01-02 ENCOUNTER — RX RENEWAL (OUTPATIENT)
Age: 50
End: 2025-01-02

## 2025-01-14 ENCOUNTER — RX RENEWAL (OUTPATIENT)
Age: 50
End: 2025-01-14

## 2025-01-21 RX ORDER — ALBUTEROL SULFATE 90 UG/1
108 (90 BASE) INHALANT RESPIRATORY (INHALATION)
Qty: 1 | Refills: 11 | Status: ACTIVE | COMMUNITY
Start: 2025-01-21 | End: 1900-01-01

## 2025-03-05 ENCOUNTER — NON-APPOINTMENT (OUTPATIENT)
Age: 50
End: 2025-03-05

## 2025-05-07 ENCOUNTER — NON-APPOINTMENT (OUTPATIENT)
Age: 50
End: 2025-05-07

## 2025-05-07 ENCOUNTER — APPOINTMENT (OUTPATIENT)
Dept: NEUROLOGY | Facility: CLINIC | Age: 50
End: 2025-05-07
Payer: COMMERCIAL

## 2025-05-07 VITALS
DIASTOLIC BLOOD PRESSURE: 82 MMHG | BODY MASS INDEX: 23.14 KG/M2 | OXYGEN SATURATION: 95 % | WEIGHT: 144 LBS | TEMPERATURE: 97.2 F | HEART RATE: 78 BPM | SYSTOLIC BLOOD PRESSURE: 119 MMHG | HEIGHT: 66 IN

## 2025-05-07 PROCEDURE — 99214 OFFICE O/P EST MOD 30 MIN: CPT

## 2025-05-07 PROCEDURE — G2211 COMPLEX E/M VISIT ADD ON: CPT | Mod: NC

## 2025-06-03 ENCOUNTER — NON-APPOINTMENT (OUTPATIENT)
Age: 50
End: 2025-06-03

## 2025-07-08 ENCOUNTER — APPOINTMENT (OUTPATIENT)
Dept: NEUROLOGY | Facility: CLINIC | Age: 50
End: 2025-07-08

## 2025-07-14 ENCOUNTER — RX RENEWAL (OUTPATIENT)
Age: 50
End: 2025-07-14

## 2025-07-21 ENCOUNTER — APPOINTMENT (OUTPATIENT)
Dept: PULMONOLOGY | Facility: CLINIC | Age: 50
End: 2025-07-21
Payer: COMMERCIAL

## 2025-07-21 PROCEDURE — 99214 OFFICE O/P EST MOD 30 MIN: CPT | Mod: 25

## 2025-07-21 PROCEDURE — 71046 X-RAY EXAM CHEST 2 VIEWS: CPT

## 2025-07-25 ENCOUNTER — APPOINTMENT (OUTPATIENT)
Dept: NEUROLOGY | Facility: CLINIC | Age: 50
End: 2025-07-25

## 2025-07-28 ENCOUNTER — APPOINTMENT (OUTPATIENT)
Dept: NEUROLOGY | Facility: CLINIC | Age: 50
End: 2025-07-28